# Patient Record
Sex: MALE | Race: WHITE | NOT HISPANIC OR LATINO | Employment: OTHER | URBAN - NONMETROPOLITAN AREA
[De-identification: names, ages, dates, MRNs, and addresses within clinical notes are randomized per-mention and may not be internally consistent; named-entity substitution may affect disease eponyms.]

---

## 2024-01-24 PROBLEM — E78.5 HYPERLIPIDEMIA: Status: ACTIVE | Noted: 2024-01-24

## 2024-01-24 PROBLEM — I10 ESSENTIAL HYPERTENSION: Status: ACTIVE | Noted: 2024-01-24

## 2024-01-24 PROBLEM — I25.10 CORONARY ARTERY DISEASE WITHOUT ANGINA PECTORIS: Status: ACTIVE | Noted: 2024-01-24

## 2024-01-24 RX ORDER — TAMSULOSIN HYDROCHLORIDE 0.4 MG/1
0.4 CAPSULE ORAL DAILY
COMMUNITY

## 2024-01-24 RX ORDER — MINOCYCLINE HYDROCHLORIDE 50 MG/1
50 TABLET ORAL AS NEEDED
COMMUNITY

## 2024-01-24 RX ORDER — LISINOPRIL 10 MG/1
1 TABLET ORAL DAILY
COMMUNITY
Start: 2021-09-08 | End: 2024-03-04 | Stop reason: SDUPTHER

## 2024-01-24 RX ORDER — GABAPENTIN 300 MG/1
1 CAPSULE ORAL 3 TIMES DAILY
COMMUNITY
Start: 2022-01-25

## 2024-01-24 RX ORDER — ATORVASTATIN CALCIUM 40 MG/1
1 TABLET, FILM COATED ORAL NIGHTLY
COMMUNITY
Start: 2022-02-22 | End: 2024-02-28 | Stop reason: SDUPTHER

## 2024-01-24 RX ORDER — CALCIUM CARBONATE/VITAMIN D3 600MG-5MCG
1 TABLET ORAL DAILY
COMMUNITY

## 2024-01-24 RX ORDER — ASPIRIN 81 MG/1
1 TABLET ORAL DAILY
COMMUNITY

## 2024-01-24 RX ORDER — CHOLECALCIFEROL (VITAMIN D3) 125 MCG
1 CAPSULE ORAL AS NEEDED
COMMUNITY

## 2024-01-24 RX ORDER — NITROGLYCERIN 0.4 MG/1
0.4 TABLET SUBLINGUAL EVERY 5 MIN PRN
COMMUNITY

## 2024-02-28 DIAGNOSIS — E78.2 MIXED HYPERLIPIDEMIA: ICD-10-CM

## 2024-02-28 RX ORDER — ATORVASTATIN CALCIUM 40 MG/1
40 TABLET, FILM COATED ORAL NIGHTLY
Qty: 90 TABLET | Refills: 3 | Status: SHIPPED | OUTPATIENT
Start: 2024-02-28 | End: 2024-03-04 | Stop reason: SDUPTHER

## 2024-03-04 ENCOUNTER — OFFICE VISIT (OUTPATIENT)
Dept: CARDIOLOGY | Facility: CLINIC | Age: 79
End: 2024-03-04
Payer: MEDICARE

## 2024-03-04 VITALS
SYSTOLIC BLOOD PRESSURE: 120 MMHG | WEIGHT: 197 LBS | DIASTOLIC BLOOD PRESSURE: 78 MMHG | HEART RATE: 68 BPM | BODY MASS INDEX: 27.58 KG/M2 | HEIGHT: 71 IN

## 2024-03-04 DIAGNOSIS — I10 ESSENTIAL HYPERTENSION: ICD-10-CM

## 2024-03-04 DIAGNOSIS — I25.10 CORONARY ARTERY DISEASE INVOLVING NATIVE CORONARY ARTERY OF NATIVE HEART WITHOUT ANGINA PECTORIS: Primary | ICD-10-CM

## 2024-03-04 DIAGNOSIS — E78.2 MIXED HYPERLIPIDEMIA: ICD-10-CM

## 2024-03-04 DIAGNOSIS — Z78.9 NEVER SMOKED TOBACCO: ICD-10-CM

## 2024-03-04 PROCEDURE — 3074F SYST BP LT 130 MM HG: CPT | Performed by: INTERNAL MEDICINE

## 2024-03-04 PROCEDURE — 1160F RVW MEDS BY RX/DR IN RCRD: CPT | Performed by: INTERNAL MEDICINE

## 2024-03-04 PROCEDURE — 3078F DIAST BP <80 MM HG: CPT | Performed by: INTERNAL MEDICINE

## 2024-03-04 PROCEDURE — 1159F MED LIST DOCD IN RCRD: CPT | Performed by: INTERNAL MEDICINE

## 2024-03-04 PROCEDURE — 1036F TOBACCO NON-USER: CPT | Performed by: INTERNAL MEDICINE

## 2024-03-04 PROCEDURE — 99213 OFFICE O/P EST LOW 20 MIN: CPT | Performed by: INTERNAL MEDICINE

## 2024-03-04 RX ORDER — LATANOPROST 50 UG/ML
1 SOLUTION/ DROPS OPHTHALMIC NIGHTLY
COMMUNITY

## 2024-03-04 RX ORDER — ATORVASTATIN CALCIUM 40 MG/1
40 TABLET, FILM COATED ORAL NIGHTLY
Qty: 90 TABLET | Refills: 3 | Status: SHIPPED | OUTPATIENT
Start: 2024-03-04 | End: 2025-03-04

## 2024-03-04 RX ORDER — LISINOPRIL 10 MG/1
10 TABLET ORAL DAILY
Qty: 90 TABLET | Refills: 3 | Status: SHIPPED | OUTPATIENT
Start: 2024-03-04 | End: 2025-03-04

## 2024-03-04 RX ORDER — DOCUSATE SODIUM 100 MG/1
200 CAPSULE, LIQUID FILLED ORAL DAILY
COMMUNITY

## 2024-03-04 ASSESSMENT — ENCOUNTER SYMPTOMS: LIGHT-HEADEDNESS: 1

## 2024-03-04 NOTE — PROGRESS NOTES
"Subjective   Arvind Ingram is a 78 y.o. male       Chief Complaint    Annual Exam          HPI             Patient is here for follow-up for cardiology remote PCI to the LAD more than 25 years ago, hypertension, hyperlipidemia and mild overweight.  Since last time I saw him he described functional class I.  He denies any cardiac complaint of chest pain, palpitation, lightheadedness, dizziness or syncope.  He remains very active.      ASSESSMENT:      1. Coronary artery disease, remote percutaneous coronary intervention to left anterior descending 25 years ago, remains completely asymptomatic, functional class I, no angina.  2. Hypertension, controlled.  3. Hyperlipidemia, controlled.  Recent lab noted and reviewed with him  4. Mildly overweight with no significant weight changes           RECOMMENDATIONS:      1. The patient will remain on current therapy.  2. We will see him back in the office in 1 year.  3. The patient will repeat his lab work prior to next office visit as ordered  4. Patient was advised to notify me change in cardiac status or symptoms   Review of Systems   Neurological:  Positive for light-headedness.   All other systems reviewed and are negative.           Vitals:    03/04/24 1359   BP: 120/78   BP Location: Left arm   Patient Position: Sitting   Pulse: 68   Weight: 89.4 kg (197 lb)   Height: 1.803 m (5' 11\")        Objective   Physical Exam  Constitutional:       Appearance: Normal appearance.   HENT:      Nose: Nose normal.   Neck:      Vascular: No carotid bruit.   Cardiovascular:      Rate and Rhythm: Normal rate.      Pulses: Normal pulses.      Heart sounds: Normal heart sounds.   Pulmonary:      Effort: Pulmonary effort is normal.   Abdominal:      General: Bowel sounds are normal.      Palpations: Abdomen is soft.   Musculoskeletal:         General: Normal range of motion.      Cervical back: Normal range of motion.      Right lower leg: No edema.      Left lower leg: No edema. "   Skin:     General: Skin is warm and dry.   Neurological:      General: No focal deficit present.      Mental Status: He is alert.   Psychiatric:         Mood and Affect: Mood normal.         Behavior: Behavior normal.         Thought Content: Thought content normal.         Judgment: Judgment normal.         Allergies  Patient has no known allergies.     Current Medications    Current Outpatient Medications:     aspirin 81 mg EC tablet, Take 1 tablet (81 mg) by mouth once daily., Disp: , Rfl:     atorvastatin (Lipitor) 40 mg tablet, Take 1 tablet (40 mg) by mouth once daily at bedtime., Disp: 90 tablet, Rfl: 3    calcium carbonate-vitamin D3 600 mg-5 mcg (200 unit) tablet, Take 1 tablet by mouth once daily., Disp: , Rfl:     docusate sodium (Colace) 100 mg capsule, Take 2 capsules (200 mg) by mouth once daily., Disp: , Rfl:     gabapentin (Neurontin) 300 mg capsule, Take 1 capsule (300 mg) by mouth 3 times a day., Disp: , Rfl:     latanoprost (Xalatan) 0.005 % ophthalmic solution, Administer 1 drop into both eyes once daily at bedtime., Disp: , Rfl:     lisinopril 10 mg tablet, Take 1 tablet (10 mg) by mouth once daily., Disp: , Rfl:     minocycline (Dynacin) 50 mg tablet, Take 1 tablet (50 mg) by mouth if needed., Disp: , Rfl:     naproxen sodium (Aleve) 220 mg capsule, Take 1 tablet by mouth if needed., Disp: , Rfl:     nitroglycerin (Nitrostat) 0.4 mg SL tablet, Place 1 tablet (0.4 mg) under the tongue every 5 minutes if needed for chest pain., Disp: , Rfl:     tamsulosin (Flomax) 0.4 mg 24 hr capsule, Take 1 capsule (0.4 mg) by mouth once daily., Disp: , Rfl:                      Assessment/Plan   1. Coronary artery disease involving native coronary artery of native heart without angina pectoris        2. Mixed hyperlipidemia        3. Essential hypertension        4. BMI 27.0-27.9,adult                 Scribe Attestation  By signing my name below, Riya BRISENO LPN , Scribe   attest that this documentation has  been prepared under the direction and in the presence of Angel Bernal MD.     Provider Attestation - Scribe documentation    All medical record entries made by the Scribe were at my direction and personally dictated by me. I have reviewed the chart and agree that the record accurately reflects my personal performance of the history, physical exam, discussion and plan.

## 2024-03-04 NOTE — LETTER
"March 4, 2024     Kei Cardoso MD  [address]  [facility] [address]    Patient: Arvind Ingram   YOB: 1945   Date of Visit: 3/4/2024       Dear Dr. Kei Cardoso MD:    Thank you for referring Arvind Ingram to me for evaluation. Below are my notes for this consultation.  If you have questions, please do not hesitate to call me. I look forward to following your patient along with you.       Sincerely,     Angel Bernal MD      CC: No Recipients  ______________________________________________________________________________________    Subjective   Arvind Ingram is a 78 y.o. male       Chief Complaint    Annual Exam          HPI             Patient is here for follow-up for cardiology remote PCI to the LAD more than 25 years ago, hypertension, hyperlipidemia and mild overweight.  Since last time I saw him he described functional class I.  He denies any cardiac complaint of chest pain, palpitation, lightheadedness, dizziness or syncope.  He remains very active.      ASSESSMENT:      1. Coronary artery disease, remote percutaneous coronary intervention to left anterior descending 25 years ago, remains completely asymptomatic, functional class I, no angina.  2. Hypertension, controlled.  3. Hyperlipidemia, controlled.  Recent lab noted and reviewed with him  4. Mildly overweight with no significant weight changes           RECOMMENDATIONS:      1. The patient will remain on current therapy.  2. We will see him back in the office in 1 year.  3. The patient will repeat his lab work prior to next office visit as ordered  4. Patient was advised to notify me change in cardiac status or symptoms   Review of Systems   Neurological:  Positive for light-headedness.   All other systems reviewed and are negative.           Vitals:    03/04/24 1359   BP: 120/78   BP Location: Left arm   Patient Position: Sitting   Pulse: 68   Weight: 89.4 kg (197 lb)   Height: 1.803 m (5' 11\")        Objective "   Physical Exam  Constitutional:       Appearance: Normal appearance.   HENT:      Nose: Nose normal.   Neck:      Vascular: No carotid bruit.   Cardiovascular:      Rate and Rhythm: Normal rate.      Pulses: Normal pulses.      Heart sounds: Normal heart sounds.   Pulmonary:      Effort: Pulmonary effort is normal.   Abdominal:      General: Bowel sounds are normal.      Palpations: Abdomen is soft.   Musculoskeletal:         General: Normal range of motion.      Cervical back: Normal range of motion.      Right lower leg: No edema.      Left lower leg: No edema.   Skin:     General: Skin is warm and dry.   Neurological:      General: No focal deficit present.      Mental Status: He is alert.   Psychiatric:         Mood and Affect: Mood normal.         Behavior: Behavior normal.         Thought Content: Thought content normal.         Judgment: Judgment normal.         Allergies  Patient has no known allergies.     Current Medications    Current Outpatient Medications:   •  aspirin 81 mg EC tablet, Take 1 tablet (81 mg) by mouth once daily., Disp: , Rfl:   •  atorvastatin (Lipitor) 40 mg tablet, Take 1 tablet (40 mg) by mouth once daily at bedtime., Disp: 90 tablet, Rfl: 3  •  calcium carbonate-vitamin D3 600 mg-5 mcg (200 unit) tablet, Take 1 tablet by mouth once daily., Disp: , Rfl:   •  docusate sodium (Colace) 100 mg capsule, Take 2 capsules (200 mg) by mouth once daily., Disp: , Rfl:   •  gabapentin (Neurontin) 300 mg capsule, Take 1 capsule (300 mg) by mouth 3 times a day., Disp: , Rfl:   •  latanoprost (Xalatan) 0.005 % ophthalmic solution, Administer 1 drop into both eyes once daily at bedtime., Disp: , Rfl:   •  lisinopril 10 mg tablet, Take 1 tablet (10 mg) by mouth once daily., Disp: , Rfl:   •  minocycline (Dynacin) 50 mg tablet, Take 1 tablet (50 mg) by mouth if needed., Disp: , Rfl:   •  naproxen sodium (Aleve) 220 mg capsule, Take 1 tablet by mouth if needed., Disp: , Rfl:   •  nitroglycerin  (Nitrostat) 0.4 mg SL tablet, Place 1 tablet (0.4 mg) under the tongue every 5 minutes if needed for chest pain., Disp: , Rfl:   •  tamsulosin (Flomax) 0.4 mg 24 hr capsule, Take 1 capsule (0.4 mg) by mouth once daily., Disp: , Rfl:                      Assessment/Plan   1. Coronary artery disease involving native coronary artery of native heart without angina pectoris        2. Mixed hyperlipidemia        3. Essential hypertension        4. BMI 27.0-27.9,adult                 Scribe Attestation  By signing my name below, I, Riya VALENTINO Scribe   attest that this documentation has been prepared under the direction and in the presence of Angel Bernal MD.     Provider Attestation - Scribe documentation    All medical record entries made by the Scribe were at my direction and personally dictated by me. I have reviewed the chart and agree that the record accurately reflects my personal performance of the history, physical exam, discussion and plan.

## 2025-02-03 DIAGNOSIS — E78.2 MIXED HYPERLIPIDEMIA: ICD-10-CM

## 2025-02-04 RX ORDER — ATORVASTATIN CALCIUM 40 MG/1
TABLET, FILM COATED ORAL
Qty: 90 TABLET | Refills: 3 | Status: SHIPPED | OUTPATIENT
Start: 2025-02-04

## 2025-03-12 ENCOUNTER — APPOINTMENT (OUTPATIENT)
Dept: CARDIOLOGY | Facility: CLINIC | Age: 80
End: 2025-03-12
Payer: MEDICARE

## 2025-03-12 VITALS
SYSTOLIC BLOOD PRESSURE: 138 MMHG | HEART RATE: 68 BPM | DIASTOLIC BLOOD PRESSURE: 64 MMHG | BODY MASS INDEX: 27.83 KG/M2 | HEIGHT: 71 IN | WEIGHT: 198.8 LBS

## 2025-03-12 DIAGNOSIS — Z78.9 NEVER SMOKED TOBACCO: ICD-10-CM

## 2025-03-12 DIAGNOSIS — I10 ESSENTIAL HYPERTENSION: ICD-10-CM

## 2025-03-12 DIAGNOSIS — E78.2 MIXED HYPERLIPIDEMIA: ICD-10-CM

## 2025-03-12 DIAGNOSIS — I25.10 CORONARY ARTERY DISEASE INVOLVING NATIVE CORONARY ARTERY OF NATIVE HEART WITHOUT ANGINA PECTORIS: Primary | ICD-10-CM

## 2025-03-12 PROCEDURE — G2211 COMPLEX E/M VISIT ADD ON: HCPCS | Performed by: INTERNAL MEDICINE

## 2025-03-12 PROCEDURE — 99213 OFFICE O/P EST LOW 20 MIN: CPT | Performed by: INTERNAL MEDICINE

## 2025-03-12 PROCEDURE — 1036F TOBACCO NON-USER: CPT | Performed by: INTERNAL MEDICINE

## 2025-03-12 PROCEDURE — 1159F MED LIST DOCD IN RCRD: CPT | Performed by: INTERNAL MEDICINE

## 2025-03-12 PROCEDURE — 1160F RVW MEDS BY RX/DR IN RCRD: CPT | Performed by: INTERNAL MEDICINE

## 2025-03-12 PROCEDURE — 3078F DIAST BP <80 MM HG: CPT | Performed by: INTERNAL MEDICINE

## 2025-03-12 PROCEDURE — 3075F SYST BP GE 130 - 139MM HG: CPT | Performed by: INTERNAL MEDICINE

## 2025-03-12 RX ORDER — NITROGLYCERIN 0.4 MG/1
0.4 TABLET SUBLINGUAL EVERY 5 MIN PRN
Qty: 100 TABLET | Refills: 1 | Status: SHIPPED | OUTPATIENT
Start: 2025-03-12

## 2025-03-12 NOTE — LETTER
"March 12, 2025     Kei Cardoso MD  [address]  [facility] [address]    Patient: Arvind Ingram   YOB: 1945   Date of Visit: 3/12/2025       Dear Dr. Kei Cardoso MD:    Thank you for referring Arvind Ingram to me for evaluation. Below are my notes for this consultation.  If you have questions, please do not hesitate to call me. I look forward to following your patient along with you.       Sincerely,     Angel Bernal MD      CC: No Recipients  ______________________________________________________________________________________    Subjective   Arvind Ingram is a 79 y.o. male       Chief Complaint    Annual Exam          HPI        Patient is here for follow-up continue management for coronary artery disease with remote PCI to the LAD almost 27 years ago, hypertension, hyperlipidemia and borderline overweight.  Since last time I saw him he reports he is feeling well.  He denies any cardiac complaint.  Recent laboratory data noted and reviewed with him.  He described functional class I.    ASSESSMENT:      1. Coronary artery disease, remote percutaneous coronary intervention to left anterior descending 27 years ago, remains completely asymptomatic, functional class I, no angina.  2. Hypertension, controlled.  3. Hyperlipidemia, controlled.  Recent lab noted and reviewed with him LDL is 63  4. Mildly overweight with no significant weight changes           RECOMMENDATIONS:      1. The patient will remain on current therapy.  2. We will see him back in the office in 1 year.  3.  I reviewed his recent lab with him.  Will renew his nitroglycerin prescription  4. Patient was advised to notify me change in cardiac status or symptoms   Review of Systems   All other systems reviewed and are negative.           Vitals:    03/12/25 1318   BP: 138/64   BP Location: Left arm   Patient Position: Sitting   Pulse: 68   Weight: 90.2 kg (198 lb 12.8 oz)   Height: 1.803 m (5' 11\")    "     Objective   Physical Exam  Constitutional:       Appearance: Normal appearance.   HENT:      Nose: Nose normal.   Neck:      Vascular: No carotid bruit.   Cardiovascular:      Rate and Rhythm: Normal rate.      Pulses: Normal pulses.      Heart sounds: Normal heart sounds.   Pulmonary:      Effort: Pulmonary effort is normal.   Abdominal:      General: Bowel sounds are normal.      Palpations: Abdomen is soft.   Musculoskeletal:         General: Normal range of motion.      Cervical back: Normal range of motion.      Right lower leg: No edema.      Left lower leg: No edema.   Skin:     General: Skin is warm and dry.   Neurological:      General: No focal deficit present.      Mental Status: He is alert.   Psychiatric:         Mood and Affect: Mood normal.         Behavior: Behavior normal.         Thought Content: Thought content normal.         Judgment: Judgment normal.         Allergies  Patient has no known allergies.     Current Medications    Current Outpatient Medications:   •  aspirin 81 mg EC tablet, Take 1 tablet (81 mg) by mouth once daily., Disp: , Rfl:   •  atorvastatin (Lipitor) 40 mg tablet, TAKE 1 TABLET BY MOUTH AT BEDTIME EVERY NIGHT, Disp: 90 tablet, Rfl: 3  •  calcium carbonate-vitamin D3 600 mg-5 mcg (200 unit) tablet, Take 1 tablet by mouth once daily., Disp: , Rfl:   •  docusate sodium (Colace) 100 mg capsule, Take 2 capsules (200 mg) by mouth once daily., Disp: , Rfl:   •  gabapentin (Neurontin) 300 mg capsule, Take 1 capsule (300 mg) by mouth 3 times a day., Disp: , Rfl:   •  latanoprost (Xalatan) 0.005 % ophthalmic solution, Administer 1 drop into both eyes once daily at bedtime., Disp: , Rfl:   •  lisinopril 10 mg tablet, Take 1 tablet (10 mg) by mouth once daily., Disp: 90 tablet, Rfl: 3  •  minocycline (Dynacin) 50 mg tablet, Take 1 tablet (50 mg) by mouth if needed., Disp: , Rfl:   •  naproxen sodium (Aleve) 220 mg capsule, Take 1 tablet by mouth if needed., Disp: , Rfl:   •   tamsulosin (Flomax) 0.4 mg 24 hr capsule, Take 1 capsule (0.4 mg) by mouth once daily., Disp: , Rfl:   •  nitroglycerin (Nitrostat) 0.4 mg SL tablet, Place 1 tablet (0.4 mg) under the tongue every 5 minutes if needed for chest pain., Disp: 100 tablet, Rfl: 1                     Assessment/Plan   1. Coronary artery disease involving native coronary artery of native heart without angina pectoris  Follow Up In Cardiology    Follow Up In Cardiology    nitroglycerin (Nitrostat) 0.4 mg SL tablet    CBC    CBC      2. Mixed hyperlipidemia  Alanine Aminotransferase    Aspartate Aminotransferase    Lipid Panel    CBC    Alanine Aminotransferase    Aspartate Aminotransferase    Lipid Panel    CBC      3. Essential hypertension  Basic Metabolic Panel    CBC    Basic Metabolic Panel    CBC      4. BMI 27.0-27.9,adult        5. Never smoked tobacco                 Scribe Attestation  By signing my name below, I, .myname  , Scribe   attest that this documentation has been prepared under the direction and in the presence of Angel Bernal MD.     Provider Attestation - Scribe documentation    All medical record entries made by the Scribe were at my direction and personally dictated by me. I have reviewed the chart and agree that the record accurately reflects my personal performance of the history, physical exam, discussion and plan.

## 2025-03-12 NOTE — PROGRESS NOTES
"Subjective   Arvind Ingram is a 79 y.o. male       Chief Complaint    Annual Exam          HPI        Patient is here for follow-up continue management for coronary artery disease with remote PCI to the LAD almost 27 years ago, hypertension, hyperlipidemia and borderline overweight.  Since last time I saw him he reports he is feeling well.  He denies any cardiac complaint.  Recent laboratory data noted and reviewed with him.  He described functional class I.    ASSESSMENT:      1. Coronary artery disease, remote percutaneous coronary intervention to left anterior descending 27 years ago, remains completely asymptomatic, functional class I, no angina.  2. Hypertension, controlled.  3. Hyperlipidemia, controlled.  Recent lab noted and reviewed with him LDL is 63  4. Mildly overweight with no significant weight changes           RECOMMENDATIONS:      1. The patient will remain on current therapy.  2. We will see him back in the office in 1 year.  3.  I reviewed his recent lab with him.  Will renew his nitroglycerin prescription  4. Patient was advised to notify me change in cardiac status or symptoms   Review of Systems   All other systems reviewed and are negative.           Vitals:    03/12/25 1318   BP: 138/64   BP Location: Left arm   Patient Position: Sitting   Pulse: 68   Weight: 90.2 kg (198 lb 12.8 oz)   Height: 1.803 m (5' 11\")        Objective   Physical Exam  Constitutional:       Appearance: Normal appearance.   HENT:      Nose: Nose normal.   Neck:      Vascular: No carotid bruit.   Cardiovascular:      Rate and Rhythm: Normal rate.      Pulses: Normal pulses.      Heart sounds: Normal heart sounds.   Pulmonary:      Effort: Pulmonary effort is normal.   Abdominal:      General: Bowel sounds are normal.      Palpations: Abdomen is soft.   Musculoskeletal:         General: Normal range of motion.      Cervical back: Normal range of motion.      Right lower leg: No edema.      Left lower leg: No edema. "   Skin:     General: Skin is warm and dry.   Neurological:      General: No focal deficit present.      Mental Status: He is alert.   Psychiatric:         Mood and Affect: Mood normal.         Behavior: Behavior normal.         Thought Content: Thought content normal.         Judgment: Judgment normal.         Allergies  Patient has no known allergies.     Current Medications    Current Outpatient Medications:     aspirin 81 mg EC tablet, Take 1 tablet (81 mg) by mouth once daily., Disp: , Rfl:     atorvastatin (Lipitor) 40 mg tablet, TAKE 1 TABLET BY MOUTH AT BEDTIME EVERY NIGHT, Disp: 90 tablet, Rfl: 3    calcium carbonate-vitamin D3 600 mg-5 mcg (200 unit) tablet, Take 1 tablet by mouth once daily., Disp: , Rfl:     docusate sodium (Colace) 100 mg capsule, Take 2 capsules (200 mg) by mouth once daily., Disp: , Rfl:     gabapentin (Neurontin) 300 mg capsule, Take 1 capsule (300 mg) by mouth 3 times a day., Disp: , Rfl:     latanoprost (Xalatan) 0.005 % ophthalmic solution, Administer 1 drop into both eyes once daily at bedtime., Disp: , Rfl:     lisinopril 10 mg tablet, Take 1 tablet (10 mg) by mouth once daily., Disp: 90 tablet, Rfl: 3    minocycline (Dynacin) 50 mg tablet, Take 1 tablet (50 mg) by mouth if needed., Disp: , Rfl:     naproxen sodium (Aleve) 220 mg capsule, Take 1 tablet by mouth if needed., Disp: , Rfl:     tamsulosin (Flomax) 0.4 mg 24 hr capsule, Take 1 capsule (0.4 mg) by mouth once daily., Disp: , Rfl:     nitroglycerin (Nitrostat) 0.4 mg SL tablet, Place 1 tablet (0.4 mg) under the tongue every 5 minutes if needed for chest pain., Disp: 100 tablet, Rfl: 1                     Assessment/Plan   1. Coronary artery disease involving native coronary artery of native heart without angina pectoris  Follow Up In Cardiology    Follow Up In Cardiology    nitroglycerin (Nitrostat) 0.4 mg SL tablet    CBC    CBC      2. Mixed hyperlipidemia  Alanine Aminotransferase    Aspartate Aminotransferase    Lipid  Panel    CBC    Alanine Aminotransferase    Aspartate Aminotransferase    Lipid Panel    CBC      3. Essential hypertension  Basic Metabolic Panel    CBC    Basic Metabolic Panel    CBC      4. BMI 27.0-27.9,adult        5. Never smoked tobacco                 Scribe Attestation  By signing my name below, I, .myname  , Scribe   attest that this documentation has been prepared under the direction and in the presence of Angel Bernal MD.     Provider Attestation - Scribe documentation    All medical record entries made by the Scribe were at my direction and personally dictated by me. I have reviewed the chart and agree that the record accurately reflects my personal performance of the history, physical exam, discussion and plan.

## 2025-03-12 NOTE — PATIENT INSTRUCTIONS
Please bring all medicines, vitamins, and herbal supplements with you when you come to the office.    Prescriptions will not be filled unless you are compliant with your follow up appointments or have a follow up appointment scheduled as per instruction of your physician. Refills should be requested at the time of your visit.     BMI was above normal measurement. Current weight: 90.2 kg (198 lb 12.8 oz)  Weight change since last visit (-) denotes wt loss 1.8 lbs   Weight loss needed to achieve BMI 25: 19.9 Lbs  Weight loss needed to achieve BMI 30: -15.8 Lbs  Provided instructions on dietary changes.

## 2025-04-12 DIAGNOSIS — I10 ESSENTIAL HYPERTENSION: ICD-10-CM

## 2025-04-12 DIAGNOSIS — I25.10 CORONARY ARTERY DISEASE INVOLVING NATIVE CORONARY ARTERY OF NATIVE HEART WITHOUT ANGINA PECTORIS: ICD-10-CM

## 2025-04-14 RX ORDER — LISINOPRIL 10 MG/1
10 TABLET ORAL DAILY
Qty: 90 TABLET | Refills: 3 | Status: SHIPPED | OUTPATIENT
Start: 2025-04-14

## 2026-03-18 ENCOUNTER — APPOINTMENT (OUTPATIENT)
Dept: CARDIOLOGY | Facility: CLINIC | Age: 81
End: 2026-03-18
Payer: MEDICARE